# Patient Record
Sex: FEMALE | Race: ASIAN | NOT HISPANIC OR LATINO | Employment: FULL TIME | URBAN - METROPOLITAN AREA
[De-identification: names, ages, dates, MRNs, and addresses within clinical notes are randomized per-mention and may not be internally consistent; named-entity substitution may affect disease eponyms.]

---

## 2024-07-01 ENCOUNTER — HOSPITAL ENCOUNTER (EMERGENCY)
Facility: HOSPITAL | Age: 19
Discharge: HOME | End: 2024-07-01
Admitting: EMERGENCY MEDICINE
Payer: COMMERCIAL

## 2024-07-01 VITALS
DIASTOLIC BLOOD PRESSURE: 72 MMHG | BODY MASS INDEX: 33.6 KG/M2 | TEMPERATURE: 97 F | HEART RATE: 55 BPM | RESPIRATION RATE: 18 BRPM | WEIGHT: 196.8 LBS | HEIGHT: 64 IN | OXYGEN SATURATION: 100 % | SYSTOLIC BLOOD PRESSURE: 112 MMHG

## 2024-07-01 DIAGNOSIS — S01.23XA INFECTED NASAL PIERCING: ICD-10-CM

## 2024-07-01 DIAGNOSIS — L08.9 INFECTED NASAL PIERCING: ICD-10-CM

## 2024-07-01 PROCEDURE — 99283 EMERGENCY DEPT VISIT LOW MDM: CPT

## 2024-07-01 PROCEDURE — 30300 REMOVE NASAL FOREIGN BODY: CPT

## 2024-07-01 RX ORDER — DOXYCYCLINE 100 MG/1
100 CAPSULE ORAL 2 TIMES DAILY
Qty: 14 CAPSULE | Refills: 0 | Status: SHIPPED | OUTPATIENT
Start: 2024-07-01 | End: 2024-07-08

## 2024-07-01 ASSESSMENT — COLUMBIA-SUICIDE SEVERITY RATING SCALE - C-SSRS
1. IN THE PAST MONTH, HAVE YOU WISHED YOU WERE DEAD OR WISHED YOU COULD GO TO SLEEP AND NOT WAKE UP?: NO
2. HAVE YOU ACTUALLY HAD ANY THOUGHTS OF KILLING YOURSELF IN THE PAST MONTH?: NO
6. HAVE YOU EVER DONE ANYTHING, STARTED TO DO ANYTHING, OR PREPARED TO DO ANYTHING TO END YOUR LIFE?: NO

## 2024-07-01 ASSESSMENT — ACTIVITIES OF DAILY LIVING (ADL): ADLS_ACUITY_SCORE: 33

## 2024-07-01 NOTE — DISCHARGE INSTRUCTIONS
You are seen and evaluated here in the emergency department for an infected nose piercing.  Fortunately, we were able to remove the piercing here and drain the infection.  Will discharge you home on some oral antibiotics to take twice daily for the next 7 days.  I do not recommend repierce in this area as I worry that this will happen again as it has been 2 years since he was at the original piercing and.  If you develop significant worsening redness, swelling, fevers or other concerns please return to the emergency department.  Otherwise, if you continue to have the keloid on the inside of your nose you can follow-up with dermatology.

## 2024-07-01 NOTE — ED PROVIDER NOTES
EMERGENCY DEPARTMENT ENCOUNTER      NAME: Maegan Jacobs  AGE: 18 year old female  YOB: 2005  MRN: [id number]  EVALUATION DATE & TIME: 7/1/2024  3:38 PM    PCP: No primary care provider on file.    ED PROVIDER: Ina Jara PA-C      Chief Complaint   Patient presents with    Wound Infection     Left nare         FINAL IMPRESSION:  1. Infected nasal piercing          MEDICAL DECISION MAKING:    Pertinent Labs & Imaging studies reviewed. (See chart for details)  18 year old female presents to the Emergency Department for evaluation of infection of left nasal piercing.  The patient has had her nose pierced through the left nare for approximately 2 years.  Over the last month she has noticed increased swelling and has now had pain and pus draining from the wound and inability to remove the piercing.  She presents today with concern for infection and not being able to remove her nose piercing.  On my evaluation, patient was vitally stable and overall well-appearing.  Examination with piercing in left nare with swelling, bleeding and keloid versus abscess on the inside of the nare.  No significant erythema externally.  Area was cleaned, anesthetized and piercing was removed as detailed below.  Small amount of drainage from the area.  Area cleaned and dressed.  We discussed antibiotics and close follow-up with primary care or dermatology if symptoms are not improving.  Patient was in agreement understand with the plan of care.  At this time, no systemic symptoms and low suspicion for significant infection and do not feel laboratory evaluation is indicated at this time.  I do feel she is appropriate for discharge home with close follow-up and antibiotics to treat infection.  Patient was in agreement and understanding with the plan of care and all questions were answered best my ability.  She was discharged home in stable condition.    Medical Decision Making  Obtained supplemental history:Supplemental  history obtained?: No  Reviewed external records: External records reviewed?: No  Care impacted by chronic illness:N/A  Care significantly affected by social determinants of health:Access to Medical Care  Did you consider but not order tests?: Work up considered but not performed and documented in chart, if applicable  Did you interpret images independently?: Independent interpretation of ECG and images noted in documentation, when applicable.  Consultation discussion with other provider:Did you involve another provider (consultant, , pharmacy, etc.)?: No  Discharge. I prescribed additional prescription strength medication(s) as charted. N/A.     ED COURSE:  4:00 PM I met with the patient, obtained history, performed an initial exam, and discussed options and plan for diagnostics and treatment here in the ED.    4:16 PM Patient discharged after being provided with extensive anticipatory guidance and given return precautions, importance of PCP follow-up emphasized.    At the conclusion of the encounter I discussed the results of all of the tests and the disposition. The questions were answered. The patient or family acknowledged understanding and was agreeable with the care plan.     MEDICATIONS GIVEN IN THE EMERGENCY:  Medications - No data to display    NEW PRESCRIPTIONS STARTED AT TODAY'S ER VISIT  New Prescriptions    DOXYCYCLINE HYCLATE (VIBRAMYCIN) 100 MG CAPSULE    Take 1 capsule (100 mg) by mouth 2 times daily for 7 days            =================================================================    HPI:    Patient information was obtained from: Patient    Use of Interpretor: Jamal        Maegan Jacobs is a 18 year old female with no pertinent history who presents to this ED by walk-in for evaluation of left nare wound.     The patient got a left nostril piercing 2 years ago. 1 month ago she noticed a bump formation inside her left nostril around the piercing. Patient has been unable to remove her (hoop)  "piercing. The bump started bleeding a few weeks ago and she has had yellow drainage with the wound. Denies any fevers or other symptoms.     Otherwise in normal state of health with no other concerns.       REVIEW OF SYSTEMS:  Negative unless otherwise stated in the above HPI.       PAST MEDICAL HISTORY:  No past medical history on file.    PAST SURGICAL HISTORY:  No past surgical history on file.        CURRENT MEDICATIONS:    No current facility-administered medications for this encounter.    Current Outpatient Medications:     doxycycline hyclate (VIBRAMYCIN) 100 MG capsule, Take 1 capsule (100 mg) by mouth 2 times daily for 7 days, Disp: 14 capsule, Rfl: 0      ALLERGIES:  No Known Allergies    FAMILY HISTORY:  No family history on file.    SOCIAL HISTORY:   Social History     Socioeconomic History    Marital status: Single     Social Determinants of Health     Financial Resource Strain: Not on File (2023)    Received from [facility]     Financial Resource Strain     Financial Resource Strain: 0   Food Insecurity: Not on File (2023)    Received from [facility]     Food Insecurity     Food: 0   Transportation Needs: Not on File (2023)    Received from [facility]     Transportation Needs     Transportation: 0   Physical Activity: Not on File (2023)    Received from [facility]     Physical Activity     Physical Activity: 0   Stress: Not on File (2023)    Received from [facility]     Stress     Stress: 0   Social Connections: Not on File (2023)    Received from [facility]     Social Connections     Social Connections and Isolation: 0   Housing Stability: Not on File (2023)    Received from [facility]     Housing Stability     Housin       VITALS:  Patient Vitals for the past 24 hrs:   BP Temp Temp src Pulse Resp SpO2 Height Weight   24 1535 121/66 97  F (36.1  C) Temporal 55 18 100 % 1.626 m (5' 4\") 89.3 kg (196 lb 12.8 oz)       PHYSICAL EXAM    Constitutional: Well developed, Well nourished, NAD  HENT: " Normocephalic, Atraumatic, Bilateral external ears normal, Oropharynx normal, mucous membranes moist, Nose normal.  Left nare with swelling, keloid versus abscess internally on the outer, piercing in place with bleeding and purulent drainage.  Neck: Normal range of motion, No tenderness, Supple, No stridor.  Eyes: PERRL, EOMI, Conjunctiva normal, No discharge.   Respiratory: Normal breath sounds, No respiratory distress, No wheezing, Speaks full sentences easily. No cough.  Cardiovascular: Normal heart rate, Regular rhythm, No murmurs, No rubs, No gallops. Chest wall nontender.  GI: Soft, No tenderness, No masses, No flank tenderness. No rebound or guarding.  Musculoskeletal: 2+ DP pulses. No edema. No cyanosis, No clubbing. Good range of motion in all major joints. No tenderness to palpation or major deformities noted. No tenderness of the CTLS spine.   Integument: Warm, Dry, No erythema, No rash. No petechiae.  Neurologic: Alert & oriented x 3, Normal motor function, Normal sensory function, No focal deficits noted. Normal gait.  Psychiatric: Affect normal, Judgment normal, Mood normal. Cooperative.    LAB:  All pertinent labs reviewed and interpreted.  No results found for this or any previous visit (from the past 24 hour(s)).      RADIOLOGY:  Reviewed all pertinent imaging. Please see official radiology report.  No orders to display       PROCEDURES:   PROCEDURE: Foreign Body Removal   INDICATIONS: Foreign Body   PROCEDURE PROVIDER: Ina Jara PA-C   SITE: Left nare   CONSENT: Risks, benefits and alternatives were discussed with and Verbal consent was obtained from Patient.   TIME OUT: Universal protocol was followed. TIME OUT conducted just prior to starting procedure confirmed patient identity, site/side, procedure, patient position, and availability of correct equipment. Yes   MEDICATION: Lidocaine 1% without epinephrine   DESCRIPTION OF PROCEDURE: Area was cleaned, anesthetized and piercing removed  from left nare.  Small incision made into the possible keloid versus abscess and small amount of drainage and blood occurred.  Area was cleaned and dressed.    COMPLICATIONS: Patient tolerated procedure well, without complication          I, Inessa Kirby, am serving as a scribe to document services personally performed by Ina Jara PA-C based on my observation and the provider's statements to me. I, Ina Jara PA-C attest that Inessa Kirby is acting in a scribe capacity, has observed my performance of the services and has documented them in accordance with my direction.    Ina Jara PA-C  Emergency Medicine  [facility]  7/1/2024     Ina Jara PA-C  07/01/24 [id number]

## 2024-07-01 NOTE — ED TRIAGE NOTES
Pt presents with redness and bleeding from left nare. Pt has a nasal piercing which she has had for 2 years, and it started to look infected about a month ago. Pt has been cleaning it and today it started bleeding when she was cleaning it.      Triage Assessment (Adult)       Row Name 07/01/24 1535          Triage Assessment    Airway WDL WDL        Respiratory WDL    Respiratory WDL WDL        Skin Circulation/Temperature WDL    Skin Circulation/Temperature WDL WDL        Cardiac WDL    Cardiac WDL WDL        Peripheral/Neurovascular WDL    Peripheral Neurovascular WDL WDL        Cognitive/Neuro/Behavioral WDL    Cognitive/Neuro/Behavioral WDL WDL